# Patient Record
Sex: MALE | Race: BLACK OR AFRICAN AMERICAN | URBAN - METROPOLITAN AREA
[De-identification: names, ages, dates, MRNs, and addresses within clinical notes are randomized per-mention and may not be internally consistent; named-entity substitution may affect disease eponyms.]

---

## 2021-05-19 ENCOUNTER — HOSPITAL ENCOUNTER (EMERGENCY)
Age: 21
Discharge: HOME | End: 2021-05-19
Attending: EMERGENCY MEDICINE

## 2021-05-19 ENCOUNTER — APPOINTMENT (OUTPATIENT)
Dept: GENERAL RADIOLOGY | Age: 21
End: 2021-05-19

## 2021-05-19 VITALS
SYSTOLIC BLOOD PRESSURE: 112 MMHG | TEMPERATURE: 96.9 F | WEIGHT: 200 LBS | HEART RATE: 72 BPM | RESPIRATION RATE: 18 BRPM | DIASTOLIC BLOOD PRESSURE: 51 MMHG | HEIGHT: 76 IN | BODY MASS INDEX: 24.36 KG/M2 | OXYGEN SATURATION: 99 %

## 2021-05-19 DIAGNOSIS — R09.89 GLOBUS SENSATION: Primary | ICD-10-CM

## 2021-05-19 DIAGNOSIS — R06.02 SHORTNESS OF BREATH: ICD-10-CM

## 2021-05-19 LAB
A/G RATIO: 1.2 (ref 1.1–2.2)
ALBUMIN SERPL-MCNC: 4.3 G/DL (ref 3.4–5)
ALP BLD-CCNC: 85 U/L (ref 40–129)
ALT SERPL-CCNC: 10 U/L (ref 10–40)
ANION GAP SERPL CALCULATED.3IONS-SCNC: 10 MMOL/L (ref 3–16)
AST SERPL-CCNC: 18 U/L (ref 15–37)
BASOPHILS ABSOLUTE: 0 K/UL (ref 0–0.2)
BASOPHILS RELATIVE PERCENT: 0.3 %
BILIRUB SERPL-MCNC: 0.4 MG/DL (ref 0–1)
BUN BLDV-MCNC: 11 MG/DL (ref 7–20)
CALCIUM SERPL-MCNC: 9.7 MG/DL (ref 8.3–10.6)
CHLORIDE BLD-SCNC: 101 MMOL/L (ref 99–110)
CO2: 27 MMOL/L (ref 21–32)
CREAT SERPL-MCNC: 1 MG/DL (ref 0.9–1.3)
EOSINOPHILS ABSOLUTE: 0.1 K/UL (ref 0–0.6)
EOSINOPHILS RELATIVE PERCENT: 1 %
GFR AFRICAN AMERICAN: >60
GFR NON-AFRICAN AMERICAN: >60
GLOBULIN: 3.7 G/DL
GLUCOSE BLD-MCNC: 97 MG/DL (ref 70–99)
HCT VFR BLD CALC: 44.7 % (ref 40.5–52.5)
HEMOGLOBIN: 14.5 G/DL (ref 13.5–17.5)
LYMPHOCYTES ABSOLUTE: 1.8 K/UL (ref 1–5.1)
LYMPHOCYTES RELATIVE PERCENT: 26.9 %
MCH RBC QN AUTO: 28.3 PG (ref 26–34)
MCHC RBC AUTO-ENTMCNC: 32.6 G/DL (ref 31–36)
MCV RBC AUTO: 87.1 FL (ref 80–100)
MONO TEST: NEGATIVE
MONOCYTES ABSOLUTE: 0.8 K/UL (ref 0–1.3)
MONOCYTES RELATIVE PERCENT: 12.3 %
NEUTROPHILS ABSOLUTE: 4 K/UL (ref 1.7–7.7)
NEUTROPHILS RELATIVE PERCENT: 59.5 %
PDW BLD-RTO: 13.5 % (ref 12.4–15.4)
PLATELET # BLD: 195 K/UL (ref 135–450)
PMV BLD AUTO: 8.6 FL (ref 5–10.5)
POTASSIUM REFLEX MAGNESIUM: 3.6 MMOL/L (ref 3.5–5.1)
RBC # BLD: 5.13 M/UL (ref 4.2–5.9)
S PYO AG THROAT QL: NEGATIVE
SODIUM BLD-SCNC: 138 MMOL/L (ref 136–145)
TOTAL PROTEIN: 8 G/DL (ref 6.4–8.2)
TROPONIN: <0.01 NG/ML
WBC # BLD: 6.8 K/UL (ref 4–11)

## 2021-05-19 PROCEDURE — 70360 X-RAY EXAM OF NECK: CPT

## 2021-05-19 PROCEDURE — 96374 THER/PROPH/DIAG INJ IV PUSH: CPT

## 2021-05-19 PROCEDURE — 71045 X-RAY EXAM CHEST 1 VIEW: CPT

## 2021-05-19 PROCEDURE — 80053 COMPREHEN METABOLIC PANEL: CPT

## 2021-05-19 PROCEDURE — 87081 CULTURE SCREEN ONLY: CPT

## 2021-05-19 PROCEDURE — 87880 STREP A ASSAY W/OPTIC: CPT

## 2021-05-19 PROCEDURE — 86308 HETEROPHILE ANTIBODY SCREEN: CPT

## 2021-05-19 PROCEDURE — 99284 EMERGENCY DEPT VISIT MOD MDM: CPT

## 2021-05-19 PROCEDURE — 6360000002 HC RX W HCPCS: Performed by: EMERGENCY MEDICINE

## 2021-05-19 PROCEDURE — 85025 COMPLETE CBC W/AUTO DIFF WBC: CPT

## 2021-05-19 PROCEDURE — 84484 ASSAY OF TROPONIN QUANT: CPT

## 2021-05-19 PROCEDURE — 93005 ELECTROCARDIOGRAM TRACING: CPT | Performed by: EMERGENCY MEDICINE

## 2021-05-19 RX ORDER — PREDNISONE 10 MG/1
60 TABLET ORAL DAILY
Qty: 30 TABLET | Refills: 0 | Status: SHIPPED | OUTPATIENT
Start: 2021-05-19 | End: 2021-05-24

## 2021-05-19 RX ORDER — ALBUTEROL SULFATE 90 UG/1
2 AEROSOL, METERED RESPIRATORY (INHALATION) EVERY 4 HOURS PRN
Qty: 1 INHALER | Refills: 0 | Status: SHIPPED | OUTPATIENT
Start: 2021-05-19

## 2021-05-19 RX ORDER — KETOROLAC TROMETHAMINE 30 MG/ML
30 INJECTION, SOLUTION INTRAMUSCULAR; INTRAVENOUS ONCE
Status: COMPLETED | OUTPATIENT
Start: 2021-05-19 | End: 2021-05-19

## 2021-05-19 RX ADMIN — KETOROLAC TROMETHAMINE 30 MG: 30 INJECTION, SOLUTION INTRAMUSCULAR at 14:36

## 2021-05-19 ASSESSMENT — ENCOUNTER SYMPTOMS
TROUBLE SWALLOWING: 1
VOMITING: 0
NAUSEA: 0
WHEEZING: 0
SHORTNESS OF BREATH: 1
DIARRHEA: 0
RHINORRHEA: 0
COUGH: 1
ABDOMINAL PAIN: 0

## 2021-05-19 ASSESSMENT — PAIN DESCRIPTION - INTENSITY: RATING_2: 8

## 2021-05-19 ASSESSMENT — PAIN SCALES - GENERAL: PAINLEVEL_OUTOF10: 10

## 2021-05-19 ASSESSMENT — PAIN DESCRIPTION - DESCRIPTORS: DESCRIPTORS_2: SHARP

## 2021-05-19 NOTE — ED PROVIDER NOTES
I independently performed a history and physical on Elina Ramos. All diagnostic, treatment, and disposition decisions were made by myself in conjunction with the advanced practice provider. I have participated in the medical decision making and directed the treatment plan and disposition of the patient. For further details of [facility] emergency department encounter, please see the advanced practice provider's documentation. CHIEF COMPLAINT  Chief Complaint   Patient presents with    Shortness of Breath     From home via EMS. Pain with swallowing X7 days, cough, started having trouble breathing this afternoon, midsternal chest pain. Given 2 duoneb, 60 mg of prednisone en route. EMS stated diaphoretic upon arrival. Hx of asthma. Briefly, Elina Ramos is a 23 y.o. male  who presents to the ED complaining of sensation of pain with swallowing and feeling of something uncomfortable in his chest for 1 week, called 911 today and arrives by EMS for it. Dry cough. H/o asthma, got 2 nebs and PO steroids en route for \"diaphoresis. \"  No fevers. Denies any choking episode. FOCUSED PHYSICAL EXAMINATION  BP (!) 112/51   Pulse 72   Temp 96.9 °F (36.1 °C) (Oral)   Resp 18   Ht 6' 4\" (1.93 m)   Wt 200 lb (90.7 kg)   SpO2 99%   BMI 24.34 kg/m²    Focused physical examination notable for no acute distress, well-appearing, well-nourished, normal speech and mentation without obvious facial droop, no obvious rash. No obvious cranial nerve deficits on my initial exam. Cough noted, RRR, CTAB on my exam without wheezes rhonchi or rales. +chest wall ttp on anterior chest wall. Oropharynx with minimal erythema, no exudate. Uvula midline. Trachea midline, no stridor.     The 12 lead EKG was interpreted by me as follows:  Rate: normal with a rate of 76  Rhythm: sinus with sinus arrhythmia  Axis: normal  Intervals: normal UT, narrow QRS, normal QTc  ST segments: no ST elevations or depressions  T waves: no abnormal inversions  Non-specific T wave changes: not present   LVH changes  Prior EKG comparison: No prior is currently available for comparison    MDM:  ED course was notable for globus sensation with sore throat and some chest discomfort. He also has a mild cough. X-ray of the soft tissue of the neck and chest are unremarkable. His labs are reassuring. Monospot negative and strep negative. No evidence of a bacterial infectious process. He was given an inhaler and some steroids for home. Troponin negative and EKG nonischemic, LVH changes c/w his young athletic habitus without anatomic distribution and not really consistent with pericarditis. Vital signs are reassuring. During the patient's ED course, the patient was given:  Medications   ketorolac (TORADOL) injection 30 mg (30 mg Intravenous Given 5/19/21 1436)        CLINICAL IMPRESSION  1. Globus sensation    2. Shortness of breath        DISPOSITION  Heather Vazquez was discharged to home in stable condition. I have discussed the findings of today's workup with the patient and addressed the patient's questions and concerns. Important warning signs as well as new or worsening symptoms which would necessitate immediate return to the ED were discussed. The plan is to discharge from the ED at this time, and the patient is in stable condition. The patient acknowledged understanding is agreeable with this plan. Patient was given scripts for the following medications. I counseled patient how to take these medications. New Prescriptions    ALBUTEROL SULFATE HFA (PROVENTIL HFA) 108 (90 BASE) MCG/ACT INHALER    Inhale 2 puffs into the lungs every 4 hours as needed for Wheezing or Shortness of Breath (Space out to every 6 hours as symptoms improve) Space out to every 6 hours as symptoms improve.     PREDNISONE (DELTASONE) 10 MG TABLET    Take 6 tablets by mouth daily for 5 doses       Follow-up with:  [facility] Emergency Department  [address] [place] [address]  [phone]  Go to   If symptoms worsen      This chart was created using Dragon dictation software. Efforts were made by me to ensure accuracy, however some errors may be present due to limitations of this technology.             Lynda Strange MD  05/19/21 5157

## 2021-05-19 NOTE — LETTER
[facility]  [address] [id number]  Phone: [phone]               May 19, 2021    Patient: Danny Brambila   YOB: 2000   Date of Visit: 5/19/2021       To Whom It May Concern:    Danny Brambila was seen and treated in our emergency department on 5/19/2021. He may return to work on 05/20/21.       Sincerely,       Mary Arteaga RN         Signature:__________________________________

## 2021-05-19 NOTE — ED PROVIDER NOTES
[address] [facility]        Pt Name: Danny Brambila  MRN: [id number]  [place] 2000  Date of evaluation: 5/19/2021  Provider: Silvia Waller  PCP: [facility]  Note Started: 2:25 PM EDT        I have seen and evaluated this patient with my supervising physician Alex Valladares MD.    [address] [facility]       Chief Complaint   Patient presents with    Shortness of Breath     From home via EMS. Pain with swallowing X7 days, cough, started having trouble breathing this afternoon, midsternal chest pain. Given 2 duoneb, 60 mg of prednisone en route. EMS stated diaphoretic upon arrival. Hx of asthma. HISTORY OF PRESENT ILLNESS   (Location, Timing/Onset, Context/Setting, Quality, Duration, Modifying Factors, Severity, Associated Signs and Symptoms)  Note limiting factors. Danny Brambila is a 23 y.o. male who presents for evaluation of several days of cough. States that he feels like he needs to cough something up but is not able to. Reports mild difficulty swallowing and feeling like something is stuck in his throat. Today reports some chest tightness and shortness of breath. He does have a history of asthma and had to be admitted for this when he was a child and states that he has not tried using an inhaler. He does not take anything daily for allergies. He denies fevers or chills. No abdominal pain nausea vomiting or diarrhea. No dizziness/lightheadedness, weakness, visual disturbance or syncope. He has no other complaints or concerns at this time. Nursing Notes were all reviewed and agreed with or any disagreements were addressed in the HPI. REVIEW OF SYSTEMS    (2-9 systems for level 4, 10 or more for level 5)     Review of Systems   Constitutional: Negative for appetite change, chills and fever. HENT: Positive for congestion and trouble swallowing. Negative for rhinorrhea.     Respiratory: Positive for cough and shortness of breath. Negative for wheezing. Cardiovascular: Positive for chest pain. Gastrointestinal: Negative for abdominal pain, diarrhea, nausea and vomiting. Genitourinary: Negative for difficulty urinating, dysuria and hematuria. Musculoskeletal: Negative for neck pain and neck stiffness. Skin: Negative for rash. Neurological: Negative for headaches. Positives and Pertinent negatives as per HPI. Except as noted above in the ROS, all other systems were reviewed and negative. PAST MEDICAL HISTORY     Past Medical History:   Diagnosis Date    Asthma          SURGICAL HISTORY   History reviewed. No pertinent surgical history. CURRENTMEDICATIONS       Previous Medications    No medications on file         ALLERGIES     Patient has no known allergies. FAMILYHISTORY     History reviewed. No pertinent family history. SOCIAL HISTORY       Social History     Tobacco Use    Smoking status: Never Smoker    Smokeless tobacco: Never Used   Vaping Use    Vaping Use: Never used   Substance Use Topics    Alcohol use: Not Currently    Drug use: Never       SCREENINGS    Ray Coma Scale  Eye Opening: Spontaneous  Best Verbal Response: Oriented  Best Motor Response: Obeys commands  Ray Coma Scale Score: 15        PHYSICAL EXAM    (up to 7 for level 4, 8 or more for level 5)     ED Triage Vitals [05/19/21 1422]   BP Temp Temp Source Pulse Resp SpO2 Height Weight   138/86 96.9 °F (36.1 °C) Oral 74 15 100 % 6' 4\" (1.93 m) 200 lb (90.7 kg)       Physical Exam  Vitals and nursing note reviewed. Constitutional:       General: He is not in acute distress. Appearance: He is well-developed. He is not ill-appearing, toxic-appearing or diaphoretic. HENT:      Head: Normocephalic and atraumatic. Right Ear: External ear normal.      Left Ear: External ear normal.      Nose: Nose normal. No congestion.       Mouth/Throat:      Mouth: Mucous membranes are moist.      Pharynx: Oropharynx is clear. Posterior oropharyngeal erythema present. No oropharyngeal exudate. Eyes:      General:         Right eye: No discharge. Left eye: No discharge. Extraocular Movements: Extraocular movements intact. Conjunctiva/sclera: Conjunctivae normal.      Pupils: Pupils are equal, round, and reactive to light. Cardiovascular:      Rate and Rhythm: Normal rate and regular rhythm. Heart sounds: Normal heart sounds. Pulmonary:      Effort: Pulmonary effort is normal. No respiratory distress. Breath sounds: Normal breath sounds. No stridor. No wheezing, rhonchi or rales. Chest:      Chest wall: No tenderness. Abdominal:      General: There is no distension. Palpations: Abdomen is soft. Tenderness: There is no abdominal tenderness. Musculoskeletal:         General: Normal range of motion. Cervical back: Normal range of motion and neck supple. Skin:     General: Skin is warm and dry. Neurological:      Mental Status: He is alert and oriented to person, place, and time.    Psychiatric:         Behavior: Behavior normal.         DIAGNOSTIC RESULTS   LABS:    Labs Reviewed   STREP SCREEN GROUP A THROAT    Narrative:     Performed at:  [facility] [address] [facility]  [place]   Phone [phone]   CULTURE, BETA STREP CONFIRM PLATES   CBC WITH AUTO DIFFERENTIAL    Narrative:     Performed at:  [facility] [address] [place] [employer]   Phone [phone]   COMPREHENSIVE METABOLIC PANEL W/ REFLEX TO MG FOR LOW K    Narrative:     Performed at:  [facility] [address]   Phone [phone]   MONONUCLEOSIS SCREEN    Narrative:     Performed at:  [facility] [address] [place] [employer]   Phone [phone]   TROPONIN    Narrative:     Performed at:  [facility] [address] [facility], [address]   Phone [phone]       All other labs were within normal range or not returned as of this dictation. EKG: All EKG's are interpreted by the Emergency Department Physician in the absence of a cardiologist.  Please see their note for interpretation of EKG. RADIOLOGY:   Non-plain film images such as CT, Ultrasound and MRI are read by the radiologist. Plain radiographic images are visualized and preliminarily interpreted by the ED Provider with the below findings:        Interpretation per the Radiologist below, if available at the time of this note:    XR NECK SOFT TISSUE   Final Result   Normal neck soft tissues. XR CHEST PORTABLE   Final Result   Normal portable chest.           No results found. PROCEDURES   Unless otherwise noted below, none     Procedures    CRITICAL CARE TIME   N/A    CONSULTS:  None      EMERGENCY DEPARTMENT COURSE and DIFFERENTIAL DIAGNOSIS/MDM:   Vitals:    Vitals:    05/19/21 1422 05/19/21 1430 05/19/21 1445 05/19/21 1500   BP: 138/86 130/76 (!) 115/90 (!) 118/59   Pulse: 74 87 73 69   Resp: 15 28 10 16   Temp: 96.9 °F (36.1 °C)      TempSrc: Oral      SpO2: 100% 99% 98% 98%   Weight: 200 lb (90.7 kg)      Height: 6' 4\" (1.93 m)          Patient was given the following medications:  Medications   ketorolac (TORADOL) injection 30 mg (30 mg Intravenous Given 5/19/21 1436)           Patient presents for evaluation of sore throat, difficulty swallowing, chest pain or shortness of breath. On exam, he appears slightly anxious but no acute distress and nontoxic. Vitals are stable and he is afebrile. Lungs are clear to auscultation bilaterally with no wheezing rales or rhonchi. Good aeration. HEENT exam is unremarkable. Chest is nontender and abdomen is benign. Patient had received 60 mg of prednisone and 2 DuoNeb breathing treatments via squad prior to arrival to the ED.   He reports mild improvement with this. He was given his Toradol for additional symptomatic relief and will be reevaluated. Please see attending note for EKG interpretation. CBC and CMP are unremarkable. Monospot is negative. Rapid strep is negative. Troponin is negative. Soft tissues the neck is unremarkable. Chest x-ray shows no acute process. Patient was asymptomatic on reevaluation. I have high suspicion for globus sensation. Possible anxiety type component as he does report increased stress in his life, ever, does have history of asthma and had improvement with steroids and breathing treatments prior to arrival in the ED. He will be treated with prednisone x5 days and given refill for his albuterol inhaler. Additional symptomatic and supportive care discussed. The patient is adequately hydrated, clinically nontoxic, and does not have any findings that would suggest impending airway issues. I do not suspect peritonsillar abscess, retropharyngeal abscess, epiglottitis or other more emergent etiology. There is no hot potato voice. Patient's oxygen saturations are good. I do not believe the patient's symptoms today are due to pulmonary embolism, pulmonary edema, pneumonia, pneumothorax, ACS, CHF, status asthmaticus, acute respiratory failure, profound anemia or metabolic abnormality, amongst other more emergent diagnostic considerations. Patient was advised to immediately return to emergency department if symptoms worsen. He is agreeable to this plan and stable for discharge at this time. FINAL IMPRESSION      1. Globus sensation    2.  Shortness of breath          DISPOSITION/PLAN   DISPOSITION Decision To Discharge 05/19/2021 03:48:38 PM      PATIENT REFERREDTO:  [facility] Emergency Department  [address]  \[facility] [place] [facility]\""  [phone]  Go to   If symptoms worsen      DISCHARGE MEDICATIONS:  New Prescriptions    ALBUTEROL SULFATE HFA (PROVENTIL HFA) 108 (90 BASE) MCG/ACT INHALER    Inhale 2 puffs into the lungs every 4 hours as needed for Wheezing or Shortness of Breath (Space out to every 6 hours as symptoms improve) Space out to every 6 hours as symptoms improve.     PREDNISONE (DELTASONE) 10 MG TABLET    Take 6 tablets by mouth daily for 5 doses       DISCONTINUED MEDICATIONS:  Discontinued Medications    No medications on file              (Please note that portions of this note were completed with a voice recognition program.  Efforts were made to edit the dictations but occasionally words are mis-transcribed.)    Ash Sanchez PA-C (electronically signed)            Marta Damon PA-C  05/19/21 0900

## 2021-05-20 LAB
EKG ATRIAL RATE: 76 BPM
EKG DIAGNOSIS: NORMAL
EKG P AXIS: 82 DEGREES
EKG P-R INTERVAL: 136 MS
EKG Q-T INTERVAL: 352 MS
EKG QRS DURATION: 96 MS
EKG QTC CALCULATION (BAZETT): 396 MS
EKG R AXIS: 82 DEGREES
EKG T AXIS: 42 DEGREES
EKG VENTRICULAR RATE: 76 BPM

## 2021-05-20 PROCEDURE — 93010 ELECTROCARDIOGRAM REPORT: CPT | Performed by: INTERNAL MEDICINE

## 2021-05-21 LAB — S PYO THROAT QL CULT: NORMAL
